# Patient Record
Sex: FEMALE | Race: WHITE | NOT HISPANIC OR LATINO | Employment: FULL TIME | URBAN - METROPOLITAN AREA
[De-identification: names, ages, dates, MRNs, and addresses within clinical notes are randomized per-mention and may not be internally consistent; named-entity substitution may affect disease eponyms.]

---

## 2018-03-22 ENCOUNTER — OFFICE VISIT CONVERTED (OUTPATIENT)
Dept: ONCOLOGY | Facility: HOSPITAL | Age: 58
End: 2018-03-22
Attending: INTERNAL MEDICINE

## 2018-09-24 ENCOUNTER — OFFICE VISIT CONVERTED (OUTPATIENT)
Dept: ONCOLOGY | Facility: HOSPITAL | Age: 58
End: 2018-09-24
Attending: INTERNAL MEDICINE

## 2019-03-25 ENCOUNTER — HOSPITAL ENCOUNTER (OUTPATIENT)
Dept: ONCOLOGY | Facility: HOSPITAL | Age: 59
Discharge: HOME | End: 2019-03-25
Attending: INTERNAL MEDICINE

## 2019-03-25 ENCOUNTER — OFFICE VISIT CONVERTED (OUTPATIENT)
Dept: ONCOLOGY | Facility: HOSPITAL | Age: 59
End: 2019-03-25
Attending: INTERNAL MEDICINE

## 2020-02-11 ENCOUNTER — OFFICE VISIT CONVERTED (OUTPATIENT)
Dept: ONCOLOGY | Facility: HOSPITAL | Age: 60
End: 2020-02-11
Attending: INTERNAL MEDICINE

## 2020-02-11 ENCOUNTER — HOSPITAL ENCOUNTER (OUTPATIENT)
Dept: ONCOLOGY | Facility: HOSPITAL | Age: 60
Discharge: HOME | End: 2020-02-11
Attending: INTERNAL MEDICINE

## 2021-05-28 VITALS
HEART RATE: 110 BPM | DIASTOLIC BLOOD PRESSURE: 94 MMHG | OXYGEN SATURATION: 96 % | WEIGHT: 239.42 LBS | TEMPERATURE: 98.5 F | BODY MASS INDEX: 38.48 KG/M2 | SYSTOLIC BLOOD PRESSURE: 151 MMHG | HEIGHT: 66 IN

## 2021-05-28 VITALS
SYSTOLIC BLOOD PRESSURE: 140 MMHG | TEMPERATURE: 98.8 F | RESPIRATION RATE: 16 BRPM | HEART RATE: 90 BPM | OXYGEN SATURATION: 99 % | DIASTOLIC BLOOD PRESSURE: 77 MMHG | SYSTOLIC BLOOD PRESSURE: 145 MMHG | RESPIRATION RATE: 16 BRPM | DIASTOLIC BLOOD PRESSURE: 69 MMHG | WEIGHT: 220.24 LBS | OXYGEN SATURATION: 98 % | HEIGHT: 66 IN | TEMPERATURE: 97.9 F | HEART RATE: 97 BPM | BODY MASS INDEX: 38.09 KG/M2 | WEIGHT: 230.6 LBS | TEMPERATURE: 99.3 F | HEART RATE: 100 BPM | WEIGHT: 236.99 LBS | DIASTOLIC BLOOD PRESSURE: 71 MMHG | HEIGHT: 66 IN | BODY MASS INDEX: 37.06 KG/M2 | SYSTOLIC BLOOD PRESSURE: 146 MMHG | BODY MASS INDEX: 35.4 KG/M2 | OXYGEN SATURATION: 97 %

## 2021-05-28 NOTE — PROGRESS NOTES
Patient: MICHAELA BOWDEN     Acct: [id number]     Report: #[id number]  UNIT #: [id number]     : 1960    Encounter Date:2020  PRIMARY CARE: HILDA GRAFF  ***Signed***  --------------------------------------------------------------------------------------------------------------------  NURSE INTAKE      Visit Type      Established Patient Visit            Chief Complaint      LYMPHOMA F/U            Referring Provider/Copies To      Primary Care Provider:  HELEN CUEVAS      Copies To:   HELEN CUEVAS            History and Present Illness      Past Oncology Illness History      Mrs. Michaela Bowden is a 57 year old  female who was diagnosed with     lymphoma (DLBCL) in 2014. She was initially diagnosed with Stage III DLBCL     who presented with left groin adenopathy in . She was treated with     chemotherapy which included 6 cycles of R-CHOP and completed treatment     12/3/2014. She had a complete response to her chemotherapy and had remained in     remission both clinically and on imaging. I have been asked to consult this case    as she wanted to establish care with a different oncologist. I am seeing her for    the first time on 17.  She denies night sweats or weight loss and states she    has not felt any new or enlarged lymph nodes.            [facility] - Oncology Interim      Patient returns for follow-up of diffuse large B-cell lymphoma.  She is now more    than 5 years out from her diagnosis and treatment.  She denies fever, chills,     weight loss, night sweats or lymphadenopathy.  She has had intermittent mild     elevations of her LFTs in the past and has been seen by gastroenterology.  Prev    iously, she states that improving her diet help normalize her LFTs.  She is     working full-time.  She reports good energy level.  She denies excessive     bruising or bleeding.            Cancer Details            Left groin-DLBCL (diffuse large B-cell lymphoma)             Clinical Staging      Stage III            Treatments      Chemotherapy      12/3/14 completed 6C R-CHOP (with other oncologist)            Clinical Trial Participant      No            ECOG Performance Status      0            PAST, FAMILY   Past Medical History      Past Medical History:  Depression, Diabetes Type 2      Hematology/Oncology (F):  Lymphoma            Past Surgical History      Biopsy (LYMPH NODE REMOVED LEFT GROIN), Cholecystectomy            Family History      Family History:  Lung Cancer (MAT GRANDFATHER), Prostate Cancer (FATHER AND 3     BROTHERS)            Social History      Marital Status:        Lives independently:  Yes      Number of Children:  3      Occupation:   [employer]            Tobacco Use      Tobacco status:  Former smoker      Smoking packs/day:  1.5      Quit status:  Quit date established (QUIT IN 2014)            Alcohol Use      Alcohol intake:  None            Substance Use      Substance use:  Denies use            REVIEW OF SYSTEMS      General:  Admits: Fatigue, Weight Gain      Eye:  Admits Corrective Lenses      ENT:  Denies Headache      Cardiovascular:  Denies Chest Pain      Respiratory:  Denies: Shortness of Air      Gastrointestinal:  Denies Constipation, Denies Diarrhea      Musculoskeletal:  Admits Painful Joints      Psychiatric:  Admits Depression            VITAL SIGNS AND SCORES      Vitals      Weight 236 lbs 15.912 oz / 107.5 kg      Temperature 97.9 F / 36.61 C - Temporal      Pulse 100      Respirations 16      Blood Pressure 146/71 Sitting, Left Arm      Pulse Oximetry 99%, RM AIR            Pain Score      Pain Scale Used:  Numerical      Pain Intensity:  0            Fatigue Score      Fatigue (0-10 scale):  3            EXAM      General Appearance:  Positive for: Alert, Oriented x3, Cooperative;          Negative for: Acute Distress      Eye:  Positive for: Anicteric Sclerae, Moist Conjunctiva      HEENT:  Positive for:  Oropharynx clear      Other      No tonsillar hypertrophy      Neck:  Positive for: Supple;          Negative for: JVD, Masses      Respiratory:  Positive for: CTAB, Normal Respiratory Effort      Abdomen/Gastro:  Positive for: Normal Active Bowel Sounds, Soft;          Negative for: Distention, Hepatosplenomegaly, Tenderness      Cardiovascular:  Positive for: RRR;          Negative for: Gallop, Murmur, Peripheral Edema, Rub      Psychiatric:  Positive for: Appropriate Affect, Intact Judgement      Lymphatic:  Negative for: Axillary, Cervical, Epitrochlear, Infraclavicular,     Occipital, Posterior auricular, Preauricular, Supraclavicular            PREVENTION      Hx Influenza Vaccination:  Yes      Date Influenza Vaccine Given:  Dec 1, 2019      Influenza Vaccine Declined:  No      2 or More Falls Past Year?:  No      Fall Past Year with Injury?:  No      Hx Pneumococcal Vaccination:  No      Encouraged to follow-up with:  PCP regarding preventative exams.      Chart initiated by      PENELOPE MEEHAN MA            ALLERGY/MEDS      Allergies      Coded Allergies:             AMOXICILLIN (Verified  Allergy, Severe, 2/11/20)           CETIRIZINE (Verified  Allergy, Severe, 2/11/20)           METFORMIN (Verified  Allergy, Mild, rash, 2/11/20)      Uncoded Allergies:             CT DYE (Allergy, Severe, 9/6/17)            Medications      Last Reconciled on 2/11/20 15:41 by HANNA WILLOUGHBY      PARoxetine HCl (Paxil) 10 Mg Tablet      10 MG PO HS, TAB         Reported         3/25/19       Ibuprofen (Ibuprofen) 200 Mg Tab      400 MG PO Q4H PRN for PAIN/CRAMPS, #100 TAB 0 Refills         Reported         9/6/17      Medications Reviewed:  No Changes made to meds            IMPRESSION/PLAN      Diagnosis      DLBCL (diffuse large B cell lymphoma)         Diffuse large B-cell lymphoma of lymph nodes of multiple regions         Lymphoma site: multiple regions            Elevated LFTs - R94.5            Thrombocytopenia -  D69.6            Notes      Patient is now more than 5 years out from her diagnosis and treatment of diffuse    large B-cell lymphoma.  Clinically she is doing well.  I see no evidence of     disease recurrence by history or physical examination.  Lab work demonstrates     slightly decreased platelet and white blood cell count which may be related to     fatty liver given her modest increases in LFTs.  I do not think they suggest any    issues from her lymphoma.  Given that she is now more than 5 years out, I would     recommend following up with primary care at least yearly with CBC, CMP and LDH.     She can return here as needed but I am happy to see her back if there are any i    ssues or questions in the future.      New Diagnostics      * CBC With Auto Diff, Year         Dx: DLBCL (diffuse large B cell lymphoma) - C83.30      * CMP Comp Metabolic Panel, Year         Dx: DLBCL (diffuse large B cell lymphoma) - C83.30            Patient Education      Patient Education Provided:  Yes            Electronically signed by HANNA WILLOUGHBY  02/11/2020 15:41       Disclaimer: Converted document may not contain table formatting or lab diagrams. Please see [employer] for the authenticated document.

## 2021-05-28 NOTE — PROGRESS NOTES
Patient: MICHAELA BOWDEN     Acct: [id number]     Report: #[id number]  UNIT #: [id number]     : 1960    Encounter Date:2019  PRIMARY CARE: HILDA GRAFF  ***Signed***  --------------------------------------------------------------------------------------------------------------------  NURSE INTAKE      Visit Type      Established Patient Visit            Chief Complaint      DLBCL      Intent of Therapy:  Curative            Referring Provider/Copies To      Provider Not Found in Lookup:  SELF      Primary Care Provider:  HELEN CUEVAS            History and Present Illness      Past Oncology Illness History      Mrs. Michaela Bowden is a 57 year old  female who was diagnosed with lymp    víctor (DLBCL) in 2014. She was initially diagnosed with Stage III DLBCL who     presented with left groin adenopathy in . She was treated with chemotherapy     which included 6 cycles of R-CHOP and completed treatment 12/3/2014. She had a     complete response to her chemotherapy and had remained in remission both     clinically and on imaging. I have been asked to consult this case as she wanted     to establish care with a different oncologist. I am seeing her for the first     time on 17.  She denies night sweats or weight loss and states she has not     felt any new or enlarged lymph nodes.            HPI - Oncology Interim      F/U lymphoma--reports feeling well--denies lymphadenopathy, weight loss or night    sweats.  She does have low back pain; however, she feels it is related to her     grandchild sleeping with her and kicking her in her back.  Reports she had lab     work completed with PCP-will request.  Denies distress or acute discomfort at     this time.            Cancer Details            Left groin-DLBCL (diffuse large B-cell lymphoma)            Clinical Staging      Stage III            Treatments      Chemotherapy      12/3/14 completed 6C R-CHOP (with other oncologist)             Clinical Trial Participant      No            ECOG Performance Status      0            PAST, FAMILY   Past Medical History      Past Medical History:  Depression, Diabetes Type 2      Hematology/Oncology (F):  Lymphoma      Other Hematology/Oncology      DLBCL            Past Surgical History      Biopsy (LYMPH NODE REMOVED LEFT GROIN), Cholecystectomy            Family History      Family History:  Lung Cancer (MAT GRANDFATHER), Prostate Cancer (FATHER AND 3     BROTHERS)            Social History      Marital Status:        Lives independently:  Yes      Number of Children:  3      Occupation:              Tobacco Use      Tobacco status:  Former smoker      Smoking packs/day:  1.5      Quit status:  Quit date established (QUIT IN 2014)            Alcohol Use      Alcohol intake:  None            Substance Use      Substance use:  Denies use            REVIEW OF SYSTEMS      General:  Admits: Fatigue;          Denies: Appetite Change, Fever, Night Sweats, Weight Gain, Weight Loss      Eye:  Denies: Blurred Vision, Corrective Lenses, Diplopia, Eye Irritation, Eye     Pain, Eye Redness, Spots in Vision, Vision Loss      ENT:  Denies: Headache, Hearing Loss, Hoarseness, Seizures, Sinus Congestion,     Sore Throat      Cardiovascular:  Denies: Chest Pain, Edema Ankles, Edema Legs, Irregular     Heartbeat, Palpitations      Respiratory:  Denies: Coughing Blood, Productive Cough, Shortness of Air,     Wheezing      Gastrointestinal:  Denies: Bloody Stools, Constipation, Diarrhea, Frequent     Heartburn, Nausea, Problem Swallowing, Tarry Stools, Unable to Control Bowels,     Vomiting      Genitourinary (female):  Denies: Blood in Urine, Decrease Urine Stream, Frequent    Urination, Incontinence, Painful Urination      Musculoskeletal:  Denies: Back Pain, Leg Cramps, Muscle Pain, Muscle Weakness,     Painful Joints, Swollen Joints      Integumentary:  Denies: Bleeds Easily, Bruises Easily, Hair Changes,  Jaundice,     Lesions, Mole Changes, Nail Changes, Pigment Changes, Rash, Skin Discoloration      Neurologic:  Denies: Dizziness, Fainting, Numbness\Tingling, Paralysis, Seizures      Psychiatric:  Admits: Depression;          Denies: Anxiety, Confused, Disoriented, Memory Loss      Endocrine:  Admits: Diabetes;          Denies: Cold Intolerance, Excessive Sweating, Excessive Thirst, Excessive     Urination, Heat Intolerance, Flushing, Hyperthyroidism, Hypothyroidism      Hematologic/Lymphatic:  Denies: Bruising, Bleeding, Enlarged Lymph Nodes,     Recurrent Infections, Transfusions      Reproductive:  Denies: Pregnant            VITAL SIGNS AND SCORES      Vitals      Height 5 ft 6.14 in / 168 cm      Weight 230 lbs 9.619 oz / 104.6 kg      BSA 2.13 m2      BMI 37.1 kg/m2      Temperature 98.8 F / 37.11 C - Temporal      Pulse 90      Respirations 16      Blood Pressure 145/77 Sitting, Left Arm      Pulse Oximetry 98%, RM AIR            Pain Score      Pain Scale Used:  Numerical      Pain Intensity:  4            Fatigue Score      Fatigue (0-10 scale):  7            EXAM      General Appearance:  Positive for: Alert, Oriented x3, Cooperative;          Negative for: Acute Distress      Eye:  Positive for: Anicteric Sclerae, Moist Conjunctiva      HEENT:  Positive for: Oropharynx clear;          Negative for: Exudates, Pallor      Other      No tonsillar hypertrophy      Respiratory:  Positive for: CTAB, Normal Respiratory Effort      Abdomen/Gastro:  Positive for: Normal Active Bowel Sounds, Soft;          Negative for: Distention, Hepatosplenomegaly, Tenderness      Cardiovascular:  Positive for: RRR;          Negative for: Gallop, Murmur, Peripheral Edema, Rub      Psychiatric:  Positive for: Appropriate Affect, Intact Judgement      Lymphatic:  Negative for: Axillary, Cervical, Epitrochlear, Infraclavicular,     Occipital, Posterior auricular, Preauricular, Supraclavicular            PREVENTION      Hx  Influenza Vaccination:  Yes      Date Influenza Vaccine Given:  Dec 1, 2018      Influenza Vaccine Declined:  No      2 or More Falls Past Year?:  No      Fall Past Year with Injury?:  No      Encouraged to follow-up with:  PCP regarding preventative exams.      Chart initiated by      PENELOPE MEEHAN MA            ALLERGY/MEDS      Allergies      Coded Allergies:             AMOXICILLIN (Verified  Allergy, Severe, 3/25/19)           CETIRIZINE (Verified  Allergy, Severe, 3/25/19)           METFORMIN (Verified  Allergy, Mild, rash, 3/25/19)      Uncoded Allergies:             CT DYE (Allergy, Severe, 9/6/17)            Medications      Last Reconciled on 3/25/19 16:30 by JOSE ANGEL GOTTLIEB      Paroxetine Hcl (Paxil*) 10 Mg Tablet      10 MG PO HS, TAB         Reported         3/25/19       Ibuprofen (Ibuprofen) 200 Mg Tab      400 MG PO Q4H PRN for PAIN/CRAMPS, #100 TAB 0 Refills         Reported         9/6/17      Medications Reviewed:  No Changes made to meds            IMPRESSION/PLAN      Impression      Diffuse large B-cell lymphoma            Diagnosis      DLBCL (diffuse large B cell lymphoma)         Diffuse large B-cell lymphoma of lymph nodes of inguinal region - C83.35         Lymphoma site: inguinal      Patient is now over 4 years out from her diagnosis and treatment.  Clinically     doing well.  I see no evidence of disease recurrence by history, physical     examination.  RTC 6 months for ongoing surveillance with labs prior.  We     discussed exercise as part of a healthy lifestyle.      New Diagnostics      * LDH, 6 Months      * CMP Comp Metabolic Panel, 6 Months      * CBC With Auto Diff, 6 Months            Notes      New Medications      * Paroxetine Hcl (Paxil*) 10 MG TABLET: 10 MG PO HS            Patient Education            Eat Well, Exercise Well, Be Well: Dietary and Fitness Guidelines      Exercise (Alternative Therapy)      Patient Education Provided:  Yes                  Disclaimer: Converted document may not contain table formatting or lab diagrams. Please see [employer] for the authenticated document.

## 2021-05-28 NOTE — PROGRESS NOTES
Patient: JUNAID BOWDEN     Acct: [id number]     Report: #[id number]  UNIT #: [id number]     : 1960    Encounter Date:2018  PRIMARY CARE: HILDA GRAFF  ***Signed***  --------------------------------------------------------------------------------------------------------------------  Chief Complaint      Mar 22, 2018      LYMPHOMA            Vitals      Height 5 ft 6.14 in / 168.0 cm      Weight 239 lbs 6.713 oz / 108.6 kg      BSA 2.30 m2      BMI 38.5 kg/m2      Temperature 98.5 F / 36.94 C - Temporal      Pulse 110      Blood Pressure 151/94 Sitting, Right Arm      Pulse Oximetry 96%, ROOM AIR            General Information      Primary Provider:  HELEN CUEVAS            Allergies      Coded Allergies:             AMOXICILLIN (Verified  Allergy, Severe, 3/22/18)           CETIRIZINE (Verified  Allergy, Severe, 3/22/18)      Uncoded Allergies:             CT DYE (Allergy, Severe, 17)            Medications      Last Reconciled on 3/22/18 15:19 by VADIM OLIVER MD      FLUoxetine HCl (FLUoxetine HCl) 20 Mg Capsule      20 MG PO QDAY, CAP         Reported         17       Pantoprazole (Protonix*) 20 Mg Tablet.dr      40 MG PO BID, TAB         Reported         17       Cholecalciferol (Vitamin D*) 2,000 Unit Cap      2000 UNITS PO BID, #60 CAP 0 Refills         Reported         17       Ibuprofen (Ibuprofen) 200 Mg Tab      400 MG PO Q4H Y for PAIN/CRAMPS, #100 TAB 0 Refills         Reported         17      Current Medications      Current Medications Reviewed 3/22/18            Pain and Fatigue Scales      Pain Assessment:           Experiencing any pain?:  No      Fatigue:           Experiencing any fatigue?:  Yes            Past Medical History      Yes Hypertension, Yes High Cholesterol, Yes Depression, Yes Arthritis, Yes     Other (CHRONIC BACK PAIN, GERD, )      Hematology/oncology:  REPORTS HX OF: Lymphoma      Previous Blood Transfusion:  Prev Blood Transfusion,how many?             Past Surgical History      REPORTS HX OF: Appendectomy, Cholecystectomy, Frature repair (FOREARM), VAD     Placement, Biopsy (LYMPH NODES)            Family History      REPORTS HX OF: Lung cancer (GRANDFATHER), Prostate cancer (BROTHERS X3), Other     Cancer History (PANCREATIC CANCER-FATHER)            Social History      Yes            Tobacco Use      Tobacco status:  Former smoker      Smoking packs/day:  1.5      Quit status:  Quit date established (QUIT IN 2014)            Alcohol Use      Alcohol intake:  None      Counseling given:  None            Substance Use      Substance use:  Denies use      Counseling given:  None            Past Oncology Illness History      Chief Complaint: DLBCL (diagnosed 2014)            Ms. Michaela Johnson is a 57 year old  female who was diagnosed with     lymphoma (DLBCL) in July 2014. She was initially diagnosed with Stage III DLBCL     who presented with left groin adenopathy in 2014. She was treated with     chemotherapy which included 6 cycles of R-CHOP and completed treatment 12/3/    2014. She had a complete response to her chemotherapy and had remained in     remission both clinically and on imaging. I have been asked to consult this     case as she wanted to establish care with a different oncologist. I am seeing     her for the first time on 9/6/17.            She denies night sweats or weight loss and states she has not felt any new or     enlarged lymph nodes.            Interim History: 3/22/18      Patient presents in clinic for 6 month follow-up. She denies any weight loss or     B symptoms. She denies any enlarged lymph nodes on palpation. She denies any     recent fever, chills, or s/s of infection. No recent significant lab     abnormalities.            ROS      General:  Denies: Fever      Eyes:  Complains of: Corrective lenses      Ears, nose, mouth, throat:  Complains of: Sinus Congestion, Denies: Headache      Cardiovascular:  Denies:  Chest pain      Respiratory:  Denies: Shortness of Air      Gastrointestinal:  Denies: Vomiting      Kidney/Bladder:  Denies: Change in urinary stream      Musculoskeletal:  Denies: Leg Cramps      Skin:  DENIES: Easy Bleeding      Neurological:  Denies: Dizziness      Psychiatric:  Complains of: AAO X 3      Endocrine:  Diabetes      Hematologic/lymphatic:  Denies: Bruising      Reproductive:  Denies Pregnant            General Appearance:  Alert, Oriented X3, No acute distress      Eyes:  Anicteric Sclerae, Moist Conjunctiva      HEENT:  Orophraynx clear, No Erythema      Neck:  No Masses or JVD      Respiratory:  CTAB, No Diminished Breath      Abdomen\Gastro:  Soft, No Masses      Cardio:  RRR, No Murmur, No, Peripheral Edema      Skin:  Normal Temperature, Normal Tone, No Rash, lesions, ulcers      Psychiatric:  Appropriate Affect, AAO x3      Neuro:  Cranial Nerve II-XII Inta, No Focal Sensory Deficit      Muscularskeletal:  Normal Gait and Station, Full ROM of extremeties      Extremities:  No Digital Cyanosis, No Digital Ischemia      Lymphatic:  No Cervical, No Supraclavicular, No Infraclavicular, No Axillary,     No Inguinal            Current Plan      It was a pleasure meeting Ms. Johnson and I appreciate participating in her     care. I have reviewed and summarized previous progress notes and labs as noted.             I counseled her I feel confident she is most like cured of her lymphoma. I did     discuss the most recent NCCN guidelines as far as follow up and surveillance     for her disease. I will see her every 6 months for labs and repeat assessment.     Scans indicated only should symptoms arise or if there are new findings. She     was agreeable to this plan. We will repeat labs every 6 months including CBC,     CMP, and LDH.            She was given time to ask questions and these questions were answered. At the     conclusion she had no additional questions or concerns.            Current  Plan: 3/22/18      Ms. Johnson continues to have no noted adenopathy or B-symptoms. We discussed     healthy life style choices and exercise.             Previous labs reviewed. I will repeat lab work including CBC, CMP, and LDH on     her next visit in 6 months. She will only require imaging studies if she     becomes symptomatic.             She was given time to ask questions and these questions were answered. At the     conclusion she had no additional questions or concerns.            Available for immediate release. Please forward a copy of this dictation to Dr. Randal Franklin.      Diffuse large B cell lymphoma - C83.30            History of chemotherapy - Z92.21            Lymphoma - C85.90      Follow-up:  6 Months      Labs Reviewed During Visit?:  Yes      Review/Other:  Received Lab Test, Ordered Lab Test, Reviewed Radiology Test,     Reviewed Medicine Test, Obtained Old Records, Reviewed and Summarized Old     Records      Patient Education Provided:  Yes      ECOG Score:  0            Hx Influenza Vaccination:  Yes      Date Influenza Vaccine Given:  Oct 1, 2016      Influenza Vaccine Declined:  No      2 or More Falls Past Year?:  No      Fall Past Year with Injury?:  No      Hx Pneumococcal Vaccination:  No      Encouraged to follow-up with:  PCP regarding preventative exams.      Chart initiated by      FACUNDO ROMERO                 Disclaimer: Converted document may not contain table formatting or lab diagrams. Please see [employer] for the authenticated document.

## 2021-05-28 NOTE — PROGRESS NOTES
Patient: MICHAELA BOWDEN     Acct: [id number]     Report: #[id number]  UNIT #: [id number]     : 1960    Encounter Date:2018  PRIMARY CARE: HILDA GRAFF  ***Signed***  --------------------------------------------------------------------------------------------------------------------  Visit Type      Established Patient Visit            Chief Complaint      lymphoma            Allergies      Coded Allergies:             AMOXICILLIN (Verified  Allergy, Severe, 3/22/18)           CETIRIZINE (Verified  Allergy, Severe, 3/22/18)           Metformin (Verified  Allergy, Mild, rash, 18)      Uncoded Allergies:             CT DYE (Allergy, Severe, 17)            Medications      Last Reconciled on 3/22/18 15:19 by VADIM OLIVER MD      FLUoxetine HCl (FLUoxetine HCl) 20 Mg Capsule      20 MG PO QDAY, CAP         Reported         17       Pantoprazole (Protonix*) 20 Mg Tablet.dr      40 MG PO BID, TAB         Reported         17       Cholecalciferol (Vitamin D*) 2,000 Unit Cap      2000 UNITS PO BID, #60 CAP 0 Refills         Reported         17       Ibuprofen (Ibuprofen) 200 Mg Tab      400 MG PO Q4H PRN for PAIN/CRAMPS, #100 TAB 0 Refills         Reported         17      Medications Reviewed:  No Changes made to meds            History and Present Illness      Past Oncology Illness History      Chief Complaint: DLBCL (diagnosed )            Ms. Michaela Bowden is a 57 year old  female who was diagnosed with     lymphoma (DLBCL) in 2014. She was initially diagnosed with Stage III DLBCL     who presented with left groin adenopathy in . She was treated with     chemotherapy which included 6 cycles of R-CHOP and completed treatment     12/3/2014. She had a complete response to her chemotherapy and had remained in     remission both clinically and on imaging. I have been asked to consult this case    as she wanted to establish care with a different oncologist. I am  seeing her for    the first time on 9/6/17.            She denies night sweats or weight loss and states she has not felt any new or     enlarged lymph nodes.            Interim History: 3/22/18      Patient presents in clinic for 6 month follow-up. She denies any weight loss or     B symptoms. She denies any enlarged lymph nodes on palpation. She denies any     recent fever, chills, or s/s of infection. No recent significant lab     abnormalities.            HPI - Oncology Interim      Patient is a 58-year-old white female who presents today for ongoing     surveillance of diffuse started B-cell lymphoma. This was diagnosed and treated     in 2014. She completed 6 cycles of R CHOP chemotherapy 12/14. She'll complete     response to treatment. On return today, she states that she is doing well. She     denies any fever, chills, weight loss, night sweats or lymphadenopathy. She does    state that she was recently diagnosed with diabetes and started on metformin to     which she had an allergic reaction. She is now using diet and exercise and her     sugar has been controlled. She has lost approximately 15 pounds voluntarily. She    reports excellent energy level and is working full time. She reports normal     bladder and bowel habits. She has no other placement.            ECOG Performance Status      0            PAST, FAMILY   Past Medical History      Past Medical History:  Hypertension, High Cholesterol, Depression, Arthritis,     Other (CHRONIC BACK PAIN, GERD, )      Hematology/oncology:  REPORTS HX OF: Lymphoma            Past Surgical History      REPORTS HX OF: Appendectomy, Cholecystectomy, Frature repair (FOREARM), VAD     Placement, Biopsy (LYMPH NODES)            Family History      REPORTS HX OF: Lung cancer (GRANDFATHER), Prostate cancer (BROTHERS X3), Other     Cancer History (PANCREATIC CANCER-FATHER)            Social History      Lives independently:  Yes            Tobacco Use      Tobacco  status:  Former smoker      Smoking packs/day:  1.5      Quit status:  Quit date established (QUIT IN 2014)            Alcohol Use      Alcohol intake:  None            Substance Use      Substance use:  Denies use            REVIEW OF SYSTEMS      General:  Denies: Appetite change, Excessive sweating, Fatigue, Fever, Night     sweats, Weight gain, Weight loss, Other      Eyes:  Denies: Blurred vision, Corrective lenses, Diplopia, Eye irritation, Eye     pain, Eye redness, Spots in vision, Vision loss, Other      Ears, nose, mouth, throat:  Denies: Headache, Seizures, Visual Changes, Hearing     loss, Sinus Congestion, Hoarseness, Sore throat, Other      Cardiovascular:  Denies: Chest pain, Irregular heartbeat, Palpitations, Swollen     ankles/legs, Other      Respiratory:  Denies: Chest pain, Shortness of Air, Productive cough, Coughing     blood, Other      Gastrointestinal:  Denies: Nausea, Vomiting, Problem swallowing, Frequent     heartburn, Constipation, Diarrhea, Tarry stools, Bloody stools, Unable to     control bowels, Other      Kidney/Bladder:  Denies: Painful Urination, Change in urinary stream, Blood in     urine, Incontinence, Frequent Urination, Decreased urine stream, Other      Musculoskeletal:  Denies: New Back pain, Leg Cramps, Painful Joints, Swollen J    oints, Muscle Pain, Muscle weakness, Other      Skin:  DENIES: Jaundice, Easy Bleeding, Lesions/changes in moles, Nail changes,     Skin Discoloration, Rash, Other      Neurological:  Denies: Dizziness, Fainting, Numbness\Tingling, Paralysis,     Seizures, Other      Psychiatric:  Complains of: AAO X 3; Denies: Anxiety, Panic attacks, Depression,    Memory loss, Other      Endocrine:  DiabetesThyroid DisorderOsteoporosisEndocrine Other      Hematologic/lymphatic:  Denies: Bruising, Bleeding, Enlarged Lymph Nodes,     Recurrent infections, Other      Reproductive:  Denies Pregnant, Denies Menopause, Denies Still Menstruating,     Denies Heavy  Periods, Denies Other            VITAL SIGNS,PAIN/FATIGUE SCORE      Vitals      Height 5 ft 6.14 in / 168.0 cm      Weight 220 lbs 3.833 oz / 99.9 kg      BSA 2.20 m2      BMI 35.4 kg/m2      Temperature 99.3 F / 37.39 C - Temporal      Pulse 97      Blood Pressure 140/69 Sitting, Left Arm      Pulse Oximetry 97%, rm air            Pain Score      Experiencing any pain?:  Yes      Pain Scale Used:  Numerical      Pain Intensity:  3            Fatigue Score      Experiencing any fatigue?:  No      Fatigue (0-10 scale):  0 (none)            EXAM      General Appearance:  Alert, Oriented X3, Cooperative, No acute distress      Eyes:  Anicteric Sclerae, Moist Conjunctiva      HEENT:  Orophraynx clear, Other (no tonsillar hypertrophy)      Neck:  Supple, No Masses or JVD      Respiratory:  CTAB, Normal Respiratory Effort      Abdomen:  Bowel Sounds, Soft;          No Distention, No Guarding, No Hepatosplenomegaly, No Tenderness      Cardio:  RRR, No Murmur, No, Peripheral Edema      Psychiatric:  Appropriate Affect, Intact Judgement      Extremities:  Other (multiple varicose veins on the right lower extremity)      Lymphatic:  No Axillary, No Cervical, No Epitrochlear, No Femoral, No Infr    aclavicular, No Inguinal, No Occipital, No Posterior auricular, No Preauricular,    No Supraclavicular            PREVENTION      Hx Influenza Vaccination:  Yes      Date Influenza Vaccine Given:  Oct 1, 2016      Influenza Vaccine Declined:  No      2 or More Falls Past Year?:  No      Fall Past Year with Injury?:  No      Hx Pneumococcal Vaccination:  No      Encouraged to follow-up with:  PCP regarding preventative exams.      Chart initiated by      Monie Latham cma            IMPRESSION/PLAN      Impression      Diffuse large B-cell lymphoma, stage III. Status post R CHOP chemotherapy     completed 12/14            Diagnosis      DLBCL (diffuse large B cell lymphoma)         Diffuse large B-cell lymphoma of lymph nodes of  multiple regions - C83.38         Lymphoma site: multiple regions      Patient is approaching 4 years out from her treatment. I see no evidence of     disease recurrence by her history, physical examination. She'll have lab work     today. Imaging only based on symptoms. RTC 6 months for ongoing surveillance.      New Diagnostics      * CMP Comp Metabolic Panel, Routine      * CBC With Auto Diff, Routine      * LDH, Routine            Diabetes mellitus         Type 2 diabetes mellitus without complication, without long-term current use        of insulin - E11.9         Diabetes mellitus type: type 2         Diabetes mellitus long term insulin use: without long term use         Diabetes mellitus complication status: without complication      Patient is using diet and exercise to control her diabetes. She has requested an    A1c which will be obtained. She also requests vitamin D level.      New Diagnostics      * Hemoglobin A1c, Routine      * Vitamin D Level, Routine            Patient Education      Patient Education Provided:  Yes                 Disclaimer: Converted document may not contain table formatting or lab diagrams. Please see [employer] for the authenticated document.

## 2022-04-25 ENCOUNTER — TELEPHONE (OUTPATIENT)
Dept: ONCOLOGY | Facility: HOSPITAL | Age: 62
End: 2022-04-25

## 2022-04-25 NOTE — TELEPHONE ENCOUNTER
Caller: Michaela Johnson    Relationship: Self    Best call back number: [phone]    What is the best time to reach you: ASAP    Who are you requesting to speak with (clinical staff, provider,  specific staff member): NURSE    Do you know the name of the person who called:     What was the call regarding: PT WANTS TO DISCUSS CARE WITH DR WILLOUGHBY    Do you require a callback: YES

## 2022-05-18 ENCOUNTER — LAB (OUTPATIENT)
Dept: ONCOLOGY | Facility: HOSPITAL | Age: 62
End: 2022-05-18

## 2022-05-18 ENCOUNTER — OFFICE VISIT (OUTPATIENT)
Dept: ONCOLOGY | Facility: HOSPITAL | Age: 62
End: 2022-05-18

## 2022-05-18 VITALS
DIASTOLIC BLOOD PRESSURE: 77 MMHG | BODY MASS INDEX: 34.69 KG/M2 | WEIGHT: 215.83 LBS | OXYGEN SATURATION: 96 % | RESPIRATION RATE: 16 BRPM | TEMPERATURE: 97.8 F | SYSTOLIC BLOOD PRESSURE: 147 MMHG | HEART RATE: 101 BPM

## 2022-05-18 DIAGNOSIS — K76.0 FATTY LIVER: ICD-10-CM

## 2022-05-18 DIAGNOSIS — C83.38 DIFFUSE LARGE B-CELL LYMPHOMA OF LYMPH NODES OF MULTIPLE REGIONS: Primary | ICD-10-CM

## 2022-05-18 DIAGNOSIS — R16.1 SPLEEN ENLARGED: ICD-10-CM

## 2022-05-18 DIAGNOSIS — C83.38 DIFFUSE LARGE B-CELL LYMPHOMA OF LYMPH NODES OF MULTIPLE REGIONS: ICD-10-CM

## 2022-05-18 DIAGNOSIS — C85.90 NON-HODGKIN'S LYMPHOMA, UNSPECIFIED BODY REGION, UNSPECIFIED NON-HODGKIN LYMPHOMA TYPE: ICD-10-CM

## 2022-05-18 PROBLEM — F41.9 ANXIETY: Status: ACTIVE | Noted: 2022-05-18

## 2022-05-18 PROBLEM — F32.A DEPRESSION: Status: ACTIVE | Noted: 2022-05-18

## 2022-05-18 LAB
ALBUMIN SERPL-MCNC: 4.36 G/DL (ref 3.5–5.2)
ALBUMIN/GLOB SERPL: 1.8 G/DL
ALP SERPL-CCNC: 90 U/L (ref 39–117)
ALT SERPL W P-5'-P-CCNC: 26 U/L (ref 1–33)
ANION GAP SERPL CALCULATED.3IONS-SCNC: 10.4 MMOL/L (ref 5–15)
AST SERPL-CCNC: 30 U/L (ref 1–32)
BASOPHILS # BLD AUTO: 0.01 10*3/MM3 (ref 0–0.2)
BASOPHILS NFR BLD AUTO: 0.3 % (ref 0–1.5)
BILIRUB SERPL-MCNC: 1.1 MG/DL (ref 0–1.2)
BUN SERPL-MCNC: 11 MG/DL (ref 8–23)
BUN/CREAT SERPL: 18.6 (ref 7–25)
CALCIUM SPEC-SCNC: 9.7 MG/DL (ref 8.6–10.5)
CHLORIDE SERPL-SCNC: 107 MMOL/L (ref 98–107)
CO2 SERPL-SCNC: 23.6 MMOL/L (ref 22–29)
CREAT SERPL-MCNC: 0.59 MG/DL (ref 0.57–1)
DEPRECATED RDW RBC AUTO: 45.9 FL (ref 37–54)
EGFRCR SERPLBLD CKD-EPI 2021: 102.7 ML/MIN/1.73
EOSINOPHIL # BLD AUTO: 0.18 10*3/MM3 (ref 0–0.4)
EOSINOPHIL # BLD MANUAL: 0.07 10*3/MM3 (ref 0–0.4)
EOSINOPHIL NFR BLD AUTO: 5.4 % (ref 0.3–6.2)
EOSINOPHIL NFR BLD MANUAL: 2 % (ref 0.3–6.2)
ERYTHROCYTE [DISTWIDTH] IN BLOOD BY AUTOMATED COUNT: 14.6 % (ref 12.3–15.4)
GLOBULIN UR ELPH-MCNC: 2.4 GM/DL
GLUCOSE SERPL-MCNC: 171 MG/DL (ref 65–99)
HCT VFR BLD AUTO: 43.2 % (ref 34–46.6)
HGB BLD-MCNC: 14.8 G/DL (ref 12–15.9)
IMM GRANULOCYTES # BLD AUTO: 0.01 10*3/MM3 (ref 0–0.05)
IMM GRANULOCYTES NFR BLD AUTO: 0.3 % (ref 0–0.5)
LARGE PLATELETS: ABNORMAL
LDH SERPL-CCNC: 260 U/L (ref 135–214)
LYMPHOCYTES # BLD AUTO: 0.52 10*3/MM3 (ref 0.7–3.1)
LYMPHOCYTES # BLD MANUAL: 0.34 10*3/MM3 (ref 0.7–3.1)
LYMPHOCYTES NFR BLD AUTO: 15.5 % (ref 19.6–45.3)
LYMPHOCYTES NFR BLD MANUAL: 4 % (ref 5–12)
MCH RBC QN AUTO: 29.2 PG (ref 26.6–33)
MCHC RBC AUTO-ENTMCNC: 34.3 G/DL (ref 31.5–35.7)
MCV RBC AUTO: 85.2 FL (ref 79–97)
MICROCYTES BLD QL: ABNORMAL
MONOCYTES # BLD AUTO: 0.26 10*3/MM3 (ref 0.1–0.9)
MONOCYTES # BLD: 0.13 10*3/MM3 (ref 0.1–0.9)
MONOCYTES NFR BLD AUTO: 7.7 % (ref 5–12)
MYELOCYTES NFR BLD MANUAL: 2 % (ref 0–0)
NEUTROPHILS # BLD AUTO: 2.76 10*3/MM3 (ref 1.7–7)
NEUTROPHILS NFR BLD AUTO: 2.38 10*3/MM3 (ref 1.7–7)
NEUTROPHILS NFR BLD AUTO: 70.8 % (ref 42.7–76)
NEUTROPHILS NFR BLD MANUAL: 82 % (ref 42.7–76)
PATHOLOGY REVIEW: YES
PLATELET # BLD AUTO: 80 10*3/MM3 (ref 140–450)
PMV BLD AUTO: 9.7 FL (ref 6–12)
POLYCHROMASIA BLD QL SMEAR: ABNORMAL
POTASSIUM SERPL-SCNC: 3.9 MMOL/L (ref 3.5–5.2)
PROT SERPL-MCNC: 6.8 G/DL (ref 6–8.5)
RBC # BLD AUTO: 5.07 10*6/MM3 (ref 3.77–5.28)
SMALL PLATELETS BLD QL SMEAR: ABNORMAL
SODIUM SERPL-SCNC: 141 MMOL/L (ref 136–145)
VARIANT LYMPHS NFR BLD MANUAL: 10 % (ref 19.6–45.3)
WBC MORPH BLD: NORMAL
WBC NRBC COR # BLD: 3.36 10*3/MM3 (ref 3.4–10.8)

## 2022-05-18 PROCEDURE — G0463 HOSPITAL OUTPT CLINIC VISIT: HCPCS

## 2022-05-18 PROCEDURE — 36415 COLL VENOUS BLD VENIPUNCTURE: CPT

## 2022-05-18 PROCEDURE — 80053 COMPREHEN METABOLIC PANEL: CPT

## 2022-05-18 PROCEDURE — 99214 OFFICE O/P EST MOD 30 MIN: CPT | Performed by: INTERNAL MEDICINE

## 2022-05-18 PROCEDURE — 85025 COMPLETE CBC W/AUTO DIFF WBC: CPT

## 2022-05-18 PROCEDURE — 83615 LACTATE (LD) (LDH) ENZYME: CPT

## 2022-05-18 RX ORDER — EMPAGLIFLOZIN 25 MG/1
TABLET, FILM COATED ORAL
COMMUNITY
Start: 2022-03-10

## 2022-05-18 RX ORDER — PAROXETINE 10 MG/1
10 TABLET, FILM COATED ORAL EVERY MORNING
COMMUNITY

## 2022-05-18 RX ORDER — LORAZEPAM 1 MG/1
TABLET ORAL
COMMUNITY
End: 2022-05-18 | Stop reason: SDUPTHER

## 2022-05-18 RX ORDER — FLUOXETINE HYDROCHLORIDE 20 MG/1
CAPSULE ORAL
COMMUNITY
End: 2022-05-18 | Stop reason: SDUPTHER

## 2022-05-18 NOTE — PROGRESS NOTES
Chief Complaint  Lymphoma    Provider, No Known  Cresencio, JOSE ANGEL Corbett          Michaela Johnson presents to [facility] for presents for reevaluation.  She is not been seen for approximately 2 years.  She is now 8 years out from R-CHOP chemotherapy for diffuse large B-cell lymphoma.  She saw her primary care provider recently and lab work was noted to have mild cytopenias.  She has had these off and on since her R-CHOP chemotherapy in 2014.  She denies any symptoms related to her blood counts.  She was seen by an outside hematologist and ultrasound of the abdomen ordered demonstrating splenomegaly and fatty liver.    Oncology/Hematology History Overview Note   2014 Left groin-DLBCL (diffuse large B-cell lymphoma)            Clinical Staging      Stage III            Treatments      Chemotherapy      12/3/14 completed 6C R-CHOP      Diffuse large B-cell lymphoma of lymph nodes of multiple regions (HCC)   5/18/2022 Initial Diagnosis    Lymphoma (HCC)         Review of Systems   Constitutional: Negative for appetite change, diaphoresis, fatigue, fever, unexpected weight gain and unexpected weight loss.   HENT: Negative for hearing loss, mouth sores, sore throat, swollen glands, trouble swallowing and voice change.    Eyes: Negative for blurred vision.   Respiratory: Negative for cough, shortness of breath and wheezing.    Cardiovascular: Negative for chest pain and palpitations.   Gastrointestinal: Negative for abdominal pain, blood in stool, constipation, diarrhea, nausea and vomiting.   Endocrine: Negative for cold intolerance and heat intolerance.   Genitourinary: Negative for difficulty urinating, dysuria, frequency, hematuria and urinary incontinence.   Musculoskeletal: Negative for arthralgias, back pain and myalgias.   Skin: Negative for rash, skin lesions and wound.   Neurological: Negative for dizziness, seizures, weakness, numbness and headache.    Hematological: Does not bruise/bleed easily.   Psychiatric/Behavioral: Negative for depressed mood. The patient is not nervous/anxious.      Current Outpatient Medications on File Prior to Visit   Medication Sig Dispense Refill   • Jardiance 25 MG tablet tablet      • PARoxetine (PAXIL) 10 MG tablet Take 10 mg by mouth Every Morning.     • [DISCONTINUED] FLUoxetine (PROzac) 20 MG capsule Prozac 20 mg oral capsule take 1 capsule (20 mg) by oral route once daily in the evening   Active     • [DISCONTINUED] LORazepam (ATIVAN) 1 MG tablet        No current facility-administered medications on file prior to visit.       Allergies   Allergen Reactions   • Amoxicillin Unknown - Low Severity     Past Medical History:   Diagnosis Date   • Diabetes mellitus (HCC)    • Diffuse large B-cell lymphoma of lymph nodes of multiple regions (HCC) 5/18/2022   • Lymphoma (HCC)      Past Surgical History:   Procedure Laterality Date   • VENOUS ACCESS DEVICE (PORT) INSERTION AND REMOVAL       Social History     Socioeconomic History   • Marital status:    Tobacco Use   • Smoking status: Former Smoker     Types: Cigarettes   Substance and Sexual Activity   • Alcohol use: Not Currently     Family History   Problem Relation Age of Onset   • Hypertension Mother    • Prostate cancer Father    • Stomach cancer Sister        Objective   Physical Exam  Vitals reviewed. Exam conducted with a chaperone present.   Constitutional:       General: She is not in acute distress.     Appearance: Normal appearance.   HENT:      Head: Normocephalic and atraumatic.      Mouth/Throat:      Comments: No tonsillar hypertrophy  Cardiovascular:      Rate and Rhythm: Normal rate and regular rhythm.      Heart sounds: Normal heart sounds. No murmur heard.    No gallop.   Pulmonary:      Effort: Pulmonary effort is normal.      Breath sounds: Normal breath sounds.   Chest:   Breasts:      Right: Axillary adenopathy (1 cm firm mobile lymph node) present. No  supraclavicular adenopathy.      Left: No axillary adenopathy or supraclavicular adenopathy.       Abdominal:      General: Abdomen is flat. Bowel sounds are normal.      Palpations: Abdomen is soft.   Musculoskeletal:      Cervical back: Neck supple.      Right lower leg: No edema.      Left lower leg: No edema.   Lymphadenopathy:      Head:      Right side of head: No tonsillar, preauricular, posterior auricular or occipital adenopathy.      Left side of head: No tonsillar, preauricular, posterior auricular or occipital adenopathy.      Cervical: No cervical adenopathy.      Upper Body:      Right upper body: Axillary adenopathy (1 cm firm mobile lymph node) present. No supraclavicular adenopathy.      Left upper body: No supraclavicular or axillary adenopathy.   Neurological:      Mental Status: She is alert and oriented to person, place, and time.   Psychiatric:         Mood and Affect: Mood normal.         Behavior: Behavior normal.         Vitals:    05/18/22 1122   BP: 147/77   Pulse: 101   Resp: 16   Temp: 97.8 °F (36.6 °C)   SpO2: 96%   Weight: 97.9 kg (215 lb 13.3 oz)   PainSc: 0-No pain     ECOG score: 0         PHQ-9 Total Score:                    Result Review :   The following data was reviewed by: Zack Lagos MD on 05/18/2022:  Lab Results   Component Value Date    HGB 14.8 05/18/2022    HCT 43.2 05/18/2022    MCV 85.2 05/18/2022    PLT 80 (L) 05/18/2022    WBC 3.36 (L) 05/18/2022    NEUTROABS 2.38 05/18/2022    LYMPHSABS 0.52 (L) 05/18/2022    MONOSABS 0.26 05/18/2022    EOSABS 0.18 05/18/2022    BASOSABS 0.01 05/18/2022     Lab Results   Component Value Date    GLUCOSE 171 (H) 05/18/2022    BUN 11 05/18/2022    CREATININE 0.59 05/18/2022     05/18/2022    K 3.9 05/18/2022     05/18/2022    CO2 23.6 05/18/2022    CALCIUM 9.7 05/18/2022    PROTEINTOT 6.8 05/18/2022    ALBUMIN 4.36 05/18/2022    BILITOT 1.1 05/18/2022    ALKPHOS 90 05/18/2022    AST 30 05/18/2022    ALT 26 05/18/2022      No results found for: MG, PHOS, FREET4, TSH    Data reviewed: Radiologic studies abd US reviewed.       Assessment and Plan    Diagnoses and all orders for this visit:    1. Diffuse large B-cell lymphoma of lymph nodes of multiple regions (HCC) (Primary)  Assessment & Plan:  S/P RCHOP chemo in 2014. She presents with mild cytopenias which are relatively stable compared to prior. One borderline LN in the right axilla and enlarged spleen.   Will obtain labs and PET scan to reassess. Further recs based on results.       Orders:  -     NM PET/CT Skull Base to Mid Thigh; Future  -     Ambulatory Referral to Gastroenterology  -     CBC & Differential; Future  -     Comprehensive Metabolic Panel; Future  -     Lactate Dehydrogenase; Future  -     Peripheral Blood Smear; Future    2. Fatty liver  Assessment & Plan:  Noted on recent US. Will refer to GI/liver for evaulation    Orders:  -     Ambulatory Referral to Gastroenterology    3. Spleen enlarged  Assessment & Plan:  Modestly enlarged on US. DDX includes underlying liver disease, involvement with NHL.     Orders:  -     Ambulatory Referral to Gastroenterology            Patient was given instructions and counseling regarding her condition or for health maintenance advice. Please see specific information pulled into the AVS if appropriate.     Zack Lagos MD    5/18/2022

## 2022-05-18 NOTE — ASSESSMENT & PLAN NOTE
S/P RCHOP chemo in 2014. She presents with mild cytopenias which are relatively stable compared to prior. One borderline LN in the right axilla and enlarged spleen.   Will obtain labs and PET scan to reassess. Further recs based on results.

## 2022-05-19 LAB
CYTO UR: NORMAL
LAB AP CASE REPORT: NORMAL
LAB AP CLINICAL INFORMATION: NORMAL
PATH REPORT.FINAL DX SPEC: NORMAL
PATH REPORT.GROSS SPEC: NORMAL

## 2022-06-06 ENCOUNTER — TELEPHONE (OUTPATIENT)
Dept: ONCOLOGY | Facility: HOSPITAL | Age: 62
End: 2022-06-06

## 2022-06-06 NOTE — TELEPHONE ENCOUNTER
WT SHIRA ACOSTA WITH [employer] TO [facility]. 6/13/2022 PET SCAN, WILL TRSF. BACK TO US TO [facility]. FOLLOW UP APPT. FOR AFTER HER NEW SCAN DATE.

## 2022-06-06 NOTE — TELEPHONE ENCOUNTER
Provider: FARTUN  Caller: JUNAID BOWDEN  Relationship to Patient: SELF    Phone Number: [phone]  Reason for Call: PT HAD TO R/S HER PET SCAN AND NEEDS TO R/S HER FU APT WITH DR WILLOUGHBY ON Sarah 15, 2022 , HER PET SCAN HAS BEEN RESCHULED TO June 29, 2022    PT HAS APT ON June 30, 2022, IN THE AFTERNOON, WOULD LIKE TO SCHEDULE TO THIS DAY, EARLY IN THE MORNING IF POSSIBLE

## 2022-06-27 ENCOUNTER — TELEPHONE (OUTPATIENT)
Dept: ONCOLOGY | Facility: HOSPITAL | Age: 62
End: 2022-06-27

## 2022-06-27 NOTE — TELEPHONE ENCOUNTER
Pt denied fever and loss of taste. Instructed to keep appts as scheduled. Verbalized understanding.

## 2022-06-27 NOTE — TELEPHONE ENCOUNTER
Caller: Michaela Johnson    Relationship to patient: Self    Best call back number: [phone]    Date of exposure: 6/27/22    COVID19 symptoms: HEADACHE AND SINUSES THAT COULD ALSO BE DUE TO TRAVEL .   HAS HAD VOMITING AND DIARHEA    Additional information or concerns: PATIENT IS SCHEDULED 6/29/22 FOR PET SCAN AND 6/30/22 FOR FU  PALTIENT TO DETERMINE IF ITHIS NEEDS TO BE RESCHEDULED OR IF SHE CAN KEEP APPT AS IS

## 2022-06-28 ENCOUNTER — TELEPHONE (OUTPATIENT)
Dept: ONCOLOGY | Facility: HOSPITAL | Age: 62
End: 2022-06-28

## 2022-06-28 NOTE — TELEPHONE ENCOUNTER
Caller: Michaela Johnson    Relationship: Self    Best call back number: [phone]      What was the call regarding: PATIENT CALLED TO LET DR WILLOUGHBY KNOW HER PET SCAN WAS CANCELLED DUE TO INSURANCE NOT APPROVING GIT YET. DOES HE STILL WANT TO SEE HER Thursday OR RESCHEDULE    Do you require a callback: YES

## 2022-06-29 NOTE — TELEPHONE ENCOUNTER
CALLED AND SPOKE WITH PATIENT TO LET HER KNOW WE HAVE HER SCHEDULED FOR HER FOLLOW UP APPT WITH DR. WILLOUGHBY HERE ON 7/13/22 AT 2:30PM. PT AWARE OF ALL UPCOMING APPTS

## 2022-06-29 NOTE — TELEPHONE ENCOUNTER
"PATIENT KNOWS YOU ALL ARE WORKING ON THIS & IS [facility]. FOR HER PET SCAN ON 7/11/2022 & YOU ALL ARE TRYING TO GET HER [facility]. TO SEE DR. WILLOUGHBY, SHE JUST WANTS TO MAKE SURE TOMORROWS APPT. IS CANC., SO IT DOES NOT GET COUNTED AS A \"NO SHOW\".  SHE KNOWS YOU ALL WILL BE CALLING HER TO [facility].  "

## 2022-06-30 ENCOUNTER — APPOINTMENT (OUTPATIENT)
Dept: ONCOLOGY | Facility: HOSPITAL | Age: 62
End: 2022-06-30

## 2022-06-30 ENCOUNTER — OFFICE VISIT (OUTPATIENT)
Dept: GASTROENTEROLOGY | Facility: CLINIC | Age: 62
End: 2022-06-30

## 2022-06-30 ENCOUNTER — LAB (OUTPATIENT)
Dept: LAB | Facility: HOSPITAL | Age: 62
End: 2022-06-30

## 2022-06-30 VITALS
HEART RATE: 98 BPM | WEIGHT: 215 LBS | DIASTOLIC BLOOD PRESSURE: 75 MMHG | SYSTOLIC BLOOD PRESSURE: 133 MMHG | BODY MASS INDEX: 34.55 KG/M2 | HEIGHT: 66 IN

## 2022-06-30 DIAGNOSIS — Z12.11 ENCOUNTER FOR SCREENING FOR MALIGNANT NEOPLASM OF COLON: ICD-10-CM

## 2022-06-30 DIAGNOSIS — R16.1 SPLEEN ENLARGED: ICD-10-CM

## 2022-06-30 DIAGNOSIS — K76.0 FATTY LIVER: ICD-10-CM

## 2022-06-30 DIAGNOSIS — K76.0 FATTY LIVER: Primary | ICD-10-CM

## 2022-06-30 PROCEDURE — 86381 MITOCHONDRIAL ANTIBODY EACH: CPT | Performed by: NURSE PRACTITIONER

## 2022-06-30 PROCEDURE — 99204 OFFICE O/P NEW MOD 45 MIN: CPT | Performed by: NURSE PRACTITIONER

## 2022-06-30 PROCEDURE — 86015 ACTIN ANTIBODY EACH: CPT | Performed by: NURSE PRACTITIONER

## 2022-06-30 PROCEDURE — 82784 ASSAY IGA/IGD/IGG/IGM EACH: CPT

## 2022-06-30 PROCEDURE — 86225 DNA ANTIBODY NATIVE: CPT | Performed by: NURSE PRACTITIONER

## 2022-06-30 PROCEDURE — 82103 ALPHA-1-ANTITRYPSIN TOTAL: CPT | Performed by: NURSE PRACTITIONER

## 2022-06-30 PROCEDURE — 86334 IMMUNOFIX E-PHORESIS SERUM: CPT

## 2022-06-30 PROCEDURE — 80074 ACUTE HEPATITIS PANEL: CPT | Performed by: NURSE PRACTITIONER

## 2022-06-30 PROCEDURE — 86038 ANTINUCLEAR ANTIBODIES: CPT | Performed by: NURSE PRACTITIONER

## 2022-06-30 PROCEDURE — 36415 COLL VENOUS BLD VENIPUNCTURE: CPT | Performed by: NURSE PRACTITIONER

## 2022-06-30 RX ORDER — SOD SULF/POT CHLORIDE/MAG SULF 1.479 G
12 TABLET ORAL TAKE AS DIRECTED
Qty: 24 TABLET | Refills: 0 | Status: SHIPPED | OUTPATIENT
Start: 2022-06-30 | End: 2022-07-13 | Stop reason: SDUPTHER

## 2022-06-30 NOTE — PROGRESS NOTES
"Chief Complaint   fatty liver    Michaela Johnson is a 61 y.o. female who presents to [facility] on referral from Zack Lagos MD for a gastroenterology evaluation of fatty liver.      History of Present Illness    Her past medical history is significant for diffuse large B-cell lymphoma status post chemotherapy in 2014.  Recently evaluated again by hematology due to mild cytopenias.  She is scheduled for a PET scan next month.  Most recent imaging did reveal fatty liver.  She reports that this has been present for several years.      Admits type II diabetes that was diagnosed following chemotherapy.      Reports noticing \"red spots\" on chest and arms.      Family hx:  Mother had fatty liver.      She reports having hemorrhoids and a rectocele.  She's never had a colonoscopy before. Will sometimes use miralax if she's feeling constipated.      She had a sigmoidoscopy several years ago that was c/w anal fissures, internal and external hemorrhoids.        Past Medical History:   Diagnosis Date   • Diabetes mellitus (HCC)    • Diffuse large B-cell lymphoma of lymph nodes of multiple regions (HCC) 5/18/2022   • Lymphoma (HCC)        Past Surgical History:   Procedure Laterality Date   • VENOUS ACCESS DEVICE (PORT) INSERTION AND REMOVAL           Current Outpatient Medications:   •  Jardiance 25 MG tablet tablet, , Disp: , Rfl:   •  PARoxetine (PAXIL) 10 MG tablet, Take 10 mg by mouth Every Morning., Disp: , Rfl:   •  vitamin D3 125 MCG (5000 UT) capsule capsule, Take 5,000 Units by mouth Daily., Disp: , Rfl:   •  Sodium Sulfate-Mag Sulfate-KCl (Sutab) 3560-455-875 MG tablet, Take 12 tablets by mouth Take As Directed. Take 12 tablets at 6 pm and 12 tablets 4 hours prior to procedure., Disp: 24 tablet, Rfl: 0     Allergies   Allergen Reactions   • Amoxicillin Unknown - Low Severity       Family History   Problem Relation Age of Onset   • Hypertension Mother    • Prostate cancer Father  " "  • Stomach cancer Sister         Social History     Social History Narrative   • Not on file       Immunization:  Immunization History   Administered Date(s) Administered   • COVID-19 (MODERNA) 1st, 2nd, 3rd Dose Only 12/30/2020, 07/23/2021        Objective       Vital Signs:   /75 (BP Location: Left arm, Patient Position: Sitting, Cuff Size: Adult)   Pulse 98   Ht 167.6 cm (66\")   Wt 97.5 kg (215 lb)   BMI 34.70 kg/m²       Physical Exam  Constitutional:       General: She is not in acute distress.     Appearance: Normal appearance. She is well-developed and normal weight.   HENT:      Head: Normocephalic and atraumatic.   Eyes:      Conjunctiva/sclera: Conjunctivae normal.      Pupils: Pupils are equal, round, and reactive to light.      Visual Fields: Right eye visual fields normal and left eye visual fields normal.   Cardiovascular:      Rate and Rhythm: Normal rate and regular rhythm.      Heart sounds: Normal heart sounds.   Pulmonary:      Effort: Pulmonary effort is normal. No retractions.      Breath sounds: Normal breath sounds and air entry.   Abdominal:      General: Bowel sounds are normal.      Palpations: Abdomen is soft.      Tenderness: There is no abdominal tenderness.      Comments: No appreciable hepatosplenomegaly or ascites   Musculoskeletal:         General: Normal range of motion.      Cervical back: Neck supple.      Right lower leg: No edema.      Left lower leg: No edema.   Lymphadenopathy:      Cervical: No cervical adenopathy.   Skin:     General: Skin is warm and dry.      Findings: No lesion.   Neurological:      General: No focal deficit present.      Mental Status: She is alert and oriented to person, place, and time.   Psychiatric:         Mood and Affect: Mood and affect normal.         Behavior: Behavior normal.         Result Review :   The following data was reviewed by: JOSE ANGEL Engel on 06/30/2022:    CBC w/diff    CBC w/Diff 5/18/22   WBC 3.36 (A)   RBC " 5.07   Hemoglobin 14.8   Hematocrit 43.2   MCV 85.2   MCH 29.2   MCHC 34.3   RDW 14.6   Platelets 80 (A)   Neutrophil Rel % 70.8   Immature Granulocyte Rel % 0.3   Lymphocyte Rel % 15.5 (A)   Monocyte Rel % 7.7   Eosinophil Rel % 5.4   Basophil Rel % 0.3   (A) Abnormal value            CMP    CMP 5/18/22   Glucose 171 (A)   BUN 11   Creatinine 0.59   Sodium 141   Potassium 3.9   Chloride 107   Calcium 9.7   Albumin 4.36   Total Bilirubin 1.1   Alkaline Phosphatase 90   AST (SGOT) 30   ALT (SGPT) 26   (A) Abnormal value            4/6/2022 complete abdominal ultrasound-Liver measures 16.4 cm.  Hepatic steatosis.  Previous cholecystectomy.  Common bile duct 6.4 mm.  Splenomegaly.  No ascites.                 Assessment and Plan    Diagnoses and all orders for this visit:    1. Fatty liver (Primary)  -     Hepatitis Panel, Acute  -     TRAVIS  -     Alpha - 1 - Antitrypsin  -     Anti-Smooth Muscle Antibody Titer  -     Immunofixation, Serum; Future  -     Mitochondrial Antibodies, M2  -     Liver Elastography; Future    2. Spleen enlarged    3. Encounter for screening for malignant neoplasm of colon  -     Case Request; Standing  -     Case Request    Other orders  -     Follow Anesthesia Guidelines / Protocol; Future  -     Obtain Informed Consent; Future  -     Sodium Sulfate-Mag Sulfate-KCl (Sutab) 0339-148-029 MG tablet; Take 12 tablets by mouth Take As Directed. Take 12 tablets at 6 pm and 12 tablets 4 hours prior to procedure.  Dispense: 24 tablet; Refill: 0      Surgical Risk and Benefits discussed: Possible risks/complications, benefits, and alternatives to surgical or invasive procedure have been explained to patient and/or legal guardian; risks include bleeding, infection, and perforation. Patient has been evaluated and can tolerate anesthesia and/or sedation. Risks, benefits, and alternatives to anesthesia and sedation have been explained to patient and/or legal guardian.        Follow Up   Return in about 4  months (around 10/30/2022).  Patient was given instructions and counseling regarding her condition or for health maintenance advice. Please see specific information pulled into the AVS if appropriate.

## 2022-07-01 LAB
ALPHA1 GLOB MFR UR ELPH: 159 MG/DL (ref 90–200)
DSDNA IGG SERPL IA-ACNC: ABNORMAL [IU]/ML
HAV IGM SERPL QL IA: NORMAL
HBV CORE IGM SERPL QL IA: NORMAL
HBV SURFACE AG SERPL QL IA: NORMAL
HCV AB SER DONR QL: NORMAL
IGA SERPL-MCNC: 275 MG/DL (ref 87–352)
IGG SERPL-MCNC: 772 MG/DL (ref 586–1602)
IGM SERPL-MCNC: 70 MG/DL (ref 26–217)
MITOCHONDRIA M2 IGG SER-ACNC: <20 UNITS (ref 0–20)
NUCLEAR IGG SER IA-RTO: NEGATIVE
PROT PATTERN SERPL IFE-IMP: NORMAL
SMA IGG SER-ACNC: 11 UNITS (ref 0–19)

## 2022-07-11 ENCOUNTER — HOSPITAL ENCOUNTER (OUTPATIENT)
Dept: PET IMAGING | Facility: HOSPITAL | Age: 62
Discharge: HOME | End: 2022-07-11

## 2022-07-11 DIAGNOSIS — C83.38 DIFFUSE LARGE B-CELL LYMPHOMA OF LYMPH NODES OF MULTIPLE REGIONS: ICD-10-CM

## 2022-07-11 PROCEDURE — 0 FLUDEOXYGLUCOSE F18 SOLUTION: Performed by: INTERNAL MEDICINE

## 2022-07-11 PROCEDURE — 78815 PET IMAGE W/CT SKULL-THIGH: CPT

## 2022-07-11 PROCEDURE — A9552 F18 FDG: HCPCS | Performed by: INTERNAL MEDICINE

## 2022-07-11 RX ADMIN — FLUDEOXYGLUCOSE F18 1 DOSE: 300 INJECTION INTRAVENOUS at 15:04

## 2022-07-13 ENCOUNTER — OFFICE VISIT (OUTPATIENT)
Dept: ONCOLOGY | Facility: HOSPITAL | Age: 62
End: 2022-07-13

## 2022-07-13 VITALS
HEART RATE: 102 BPM | RESPIRATION RATE: 16 BRPM | DIASTOLIC BLOOD PRESSURE: 68 MMHG | OXYGEN SATURATION: 97 % | TEMPERATURE: 97.5 F | SYSTOLIC BLOOD PRESSURE: 147 MMHG | WEIGHT: 217.81 LBS | BODY MASS INDEX: 35.16 KG/M2

## 2022-07-13 DIAGNOSIS — C83.38 DIFFUSE LARGE B-CELL LYMPHOMA OF LYMPH NODES OF MULTIPLE REGIONS: Primary | ICD-10-CM

## 2022-07-13 PROCEDURE — 99213 OFFICE O/P EST LOW 20 MIN: CPT | Performed by: INTERNAL MEDICINE

## 2022-07-13 PROCEDURE — G0463 HOSPITAL OUTPT CLINIC VISIT: HCPCS | Performed by: INTERNAL MEDICINE

## 2022-07-13 NOTE — ASSESSMENT & PLAN NOTE
Pt has no evidence of disease by history, PE or PET scan. She does have an enlarged spleen but not avid on scan. This may be related to her liver disease. The blood counts are slightly abnormal but due to sequestration. No intervention required at this time. RTC 6 mo for ov with labs prior

## 2022-07-13 NOTE — PROGRESS NOTES
Chief Complaint  Results    Cresencio Jillian Castle, JOSE ANGEL    Giana Johnson presents to [facility] for follow up. She notes that she is feeling well. She denies fever, chills, weight loss, night sweats or adenopathy. She has reasonable energy. She denies excessive bruising/bleeding.   She has been evaluated by GI for fatty liver and has an upcoming fibroscan      Oncology/Hematology History Overview Note   2014 Left groin-DLBCL (diffuse large B-cell lymphoma)            Clinical Staging      Stage III            Treatments      Chemotherapy      12/3/14 completed 6C R-CHOP      Diffuse large B-cell lymphoma of lymph nodes of multiple regions (HCC)   5/18/2022 Initial Diagnosis    Lymphoma (HCC)         Review of Systems   Constitutional: Negative for appetite change, diaphoresis, fatigue, fever, unexpected weight gain and unexpected weight loss.   HENT: Negative for hearing loss, mouth sores, sore throat, swollen glands, trouble swallowing and voice change.    Eyes: Negative for blurred vision.   Respiratory: Negative for cough, shortness of breath and wheezing.    Cardiovascular: Negative for chest pain and palpitations.   Gastrointestinal: Negative for abdominal pain, blood in stool, constipation, diarrhea, nausea and vomiting.   Endocrine: Negative for cold intolerance and heat intolerance.   Genitourinary: Negative for difficulty urinating, dysuria, frequency, hematuria and urinary incontinence.   Musculoskeletal: Negative for arthralgias, back pain and myalgias.   Skin: Negative for rash, skin lesions and wound.   Neurological: Negative for dizziness, seizures, weakness, numbness and headache.   Hematological: Does not bruise/bleed easily.   Psychiatric/Behavioral: Negative for depressed mood. The patient is nervous/anxious.      Current Outpatient Medications on File Prior to Visit   Medication Sig Dispense Refill   • Jardiance 25 MG tablet tablet       • PARoxetine (PAXIL) 10 MG tablet Take 10 mg by mouth Every Morning.     • vitamin D3 125 MCG (5000 UT) capsule capsule Take 5,000 Units by mouth Daily.     • [DISCONTINUED] Sodium Sulfate-Mag Sulfate-KCl (Sutab) 3715-911-053 MG tablet Take 12 tablets by mouth Take As Directed. Take 12 tablets at 6 pm and 12 tablets 4 hours prior to procedure. 24 tablet 0     No current facility-administered medications on file prior to visit.       Allergies   Allergen Reactions   • Amoxicillin Unknown - Low Severity     Past Medical History:   Diagnosis Date   • Diabetes mellitus (HCC)    • Diffuse large B-cell lymphoma of lymph nodes of multiple regions (HCC) 5/18/2022   • Lymphoma (HCC)      Past Surgical History:   Procedure Laterality Date   • VENOUS ACCESS DEVICE (PORT) INSERTION AND REMOVAL       Social History     Socioeconomic History   • Marital status:    Tobacco Use   • Smoking status: Former Smoker     Types: Cigarettes   • Smokeless tobacco: Never Used   Vaping Use   • Vaping Use: Never used   Substance and Sexual Activity   • Alcohol use: Not Currently   • Drug use: Never   • Sexual activity: Defer     Family History   Problem Relation Age of Onset   • Hypertension Mother    • Prostate cancer Father    • Stomach cancer Sister        Objective   Physical Exam  Vitals reviewed. Exam conducted with a chaperone present.   Constitutional:       General: She is not in acute distress.     Appearance: Normal appearance.   Cardiovascular:      Rate and Rhythm: Normal rate and regular rhythm.      Heart sounds: Normal heart sounds. No murmur heard.    No gallop.   Pulmonary:      Effort: Pulmonary effort is normal.      Breath sounds: Normal breath sounds.   Chest:   Breasts:      Right: No axillary adenopathy or supraclavicular adenopathy.      Left: No axillary adenopathy or supraclavicular adenopathy.       Abdominal:      General: Abdomen is flat. Bowel sounds are normal.      Palpations: Abdomen is soft.    Musculoskeletal:      Cervical back: Neck supple.      Right lower leg: No edema.      Left lower leg: No edema.   Lymphadenopathy:      Head:      Right side of head: No preauricular, posterior auricular or occipital adenopathy.      Left side of head: No preauricular, posterior auricular or occipital adenopathy.      Cervical: No cervical adenopathy.      Upper Body:      Right upper body: No supraclavicular or axillary adenopathy.      Left upper body: No supraclavicular or axillary adenopathy.   Neurological:      Mental Status: She is alert and oriented to person, place, and time.   Psychiatric:         Mood and Affect: Mood normal.         Behavior: Behavior normal.         Vitals:    07/13/22 1430   BP: 147/68   Pulse: 102   Resp: 16   Temp: 97.5 °F (36.4 °C)   SpO2: 97%   Weight: 98.8 kg (217 lb 13 oz)   PainSc: 0-No pain     ECOG score: 0         PHQ-9 Total Score:                    Result Review :   The following data was reviewed by: Zack Lagos MD on 07/13/2022:  Lab Results   Component Value Date    HGB 14.8 05/18/2022    HCT 43.2 05/18/2022    MCV 85.2 05/18/2022    PLT 80 (L) 05/18/2022    WBC 3.36 (L) 05/18/2022    NEUTROABS 2.38 05/18/2022    NEUTROABS 2.76 05/18/2022    LYMPHSABS 0.52 (L) 05/18/2022    MONOSABS 0.26 05/18/2022    EOSABS 0.18 05/18/2022    EOSABS 0.07 05/18/2022    BASOSABS 0.01 05/18/2022     Lab Results   Component Value Date    GLUCOSE 171 (H) 05/18/2022    BUN 11 05/18/2022    CREATININE 0.59 05/18/2022     05/18/2022    K 3.9 05/18/2022     05/18/2022    CO2 23.6 05/18/2022    CALCIUM 9.7 05/18/2022    PROTEINTOT 6.8 05/18/2022    ALBUMIN 4.36 05/18/2022    BILITOT 1.1 05/18/2022    ALKPHOS 90 05/18/2022    AST 30 05/18/2022    ALT 26 05/18/2022     No results found for: MG, PHOS, FREET4, TSH    Data reviewed: Radiologic studies PET scan shows no adenopathy or masses with elevated SUV. Spleen is enlarged at 19cm      Assessment and Plan    Diagnoses and all  orders for this visit:    1. Diffuse large B-cell lymphoma of lymph nodes of multiple regions (HCC) (Primary)  Assessment & Plan:  Pt has no evidence of disease by history, PE or PET scan. She does have an enlarged spleen but not avid on scan. This may be related to her liver disease. The blood counts are slightly abnormal but due to sequestration. No intervention required at this time. RTC 6 mo for ov with labs prior    Orders:  -     CBC & Differential; Future  -     Comprehensive Metabolic Panel; Future  -     Lactate Dehydrogenase; Future          Patient Follow Up: 6 m    Patient was given instructions and counseling regarding her condition or for health maintenance advice. Please see specific information pulled into the AVS if appropriate.     Zack Lagos MD    7/13/2022

## 2022-08-15 ENCOUNTER — TELEPHONE (OUTPATIENT)
Dept: GASTROENTEROLOGY | Facility: CLINIC | Age: 62
End: 2022-08-15

## 2022-08-15 NOTE — TELEPHONE ENCOUNTER
Patient said she doesn't want to get the Fibro Scan yet. She said she has to do a Pet Scan first.

## 2022-10-24 ENCOUNTER — TELEPHONE (OUTPATIENT)
Dept: GASTROENTEROLOGY | Facility: CLINIC | Age: 62
End: 2022-10-24

## 2022-10-24 NOTE — TELEPHONE ENCOUNTER
Patient left voice message at [phone] wanting to reschedule her scope from November till sometime after Jan 1st. She would like a call back as soon as possible.

## 2022-11-16 ENCOUNTER — TELEPHONE (OUTPATIENT)
Dept: ONCOLOGY | Facility: HOSPITAL | Age: 62
End: 2022-11-16

## 2022-11-16 NOTE — TELEPHONE ENCOUNTER
Caller: Michaela Johnson    Relationship: Self    Best call back number: [phone]    Who are you requesting to speak with (clinical staff, provider,  specific staff member):NON-CLINICAL      What was the call regarding: PATIENT RETURNING MISSED CALL    Do you require a callback: YES

## 2023-01-13 ENCOUNTER — TELEPHONE (OUTPATIENT)
Dept: ONCOLOGY | Facility: HOSPITAL | Age: 63
End: 2023-01-13

## 2023-01-13 NOTE — TELEPHONE ENCOUNTER
Caller: Michaela Johnson    Relationship: Self    Best call back number: [phone]    What is the best time to reach you: ANYTIME    Who are you requesting to speak with (clinical staff, provider,  specific staff member): SCHEDULING    What was the call regarding: PT WANTS TO CANC HER 1/23 APPT - WILL CALLBACK IN April TO R/S.     Do you require a callback: NO CALLBACK REQ.

## 2023-03-13 ENCOUNTER — TELEPHONE (OUTPATIENT)
Dept: GASTROENTEROLOGY | Facility: CLINIC | Age: 63
End: 2023-03-13
Payer: COMMERCIAL

## 2023-03-13 NOTE — TELEPHONE ENCOUNTER
I have called patient and left a detailed message for an upcoming procedure including date and a good callback number for any questions.

## 2023-03-27 ENCOUNTER — TELEPHONE (OUTPATIENT)
Dept: GASTROENTEROLOGY | Facility: CLINIC | Age: 63
End: 2023-03-27
Payer: COMMERCIAL

## 2023-03-27 NOTE — TELEPHONE ENCOUNTER
Attempted to contact pt in regards to missed colonoscopy. Left a vm requesting a return call if patient would like to r/s.   Certified letter mailed.